# Patient Record
Sex: FEMALE | URBAN - METROPOLITAN AREA
[De-identification: names, ages, dates, MRNs, and addresses within clinical notes are randomized per-mention and may not be internally consistent; named-entity substitution may affect disease eponyms.]

---

## 2017-01-03 ENCOUNTER — APPOINTMENT (RX ONLY)
Dept: URBAN - METROPOLITAN AREA CLINIC 106 | Facility: CLINIC | Age: 57
Setting detail: DERMATOLOGY
End: 2017-01-03

## 2017-01-03 DIAGNOSIS — L72.0 EPIDERMAL CYST: ICD-10-CM

## 2017-01-03 DIAGNOSIS — L85.3 XEROSIS CUTIS: ICD-10-CM

## 2017-01-03 PROBLEM — L70.0 ACNE VULGARIS: Status: ACTIVE | Noted: 2017-01-03

## 2017-01-03 PROBLEM — J30.1 ALLERGIC RHINITIS DUE TO POLLEN: Status: ACTIVE | Noted: 2017-01-03

## 2017-01-03 PROBLEM — L23.7 ALLERGIC CONTACT DERMATITIS DUE TO PLANTS, EXCEPT FOOD: Status: ACTIVE | Noted: 2017-01-03

## 2017-01-03 PROBLEM — L55.1 SUNBURN OF SECOND DEGREE: Status: ACTIVE | Noted: 2017-01-03

## 2017-01-03 PROBLEM — D48.5 NEOPLASM OF UNCERTAIN BEHAVIOR OF SKIN: Status: ACTIVE | Noted: 2017-01-03

## 2017-01-03 PROCEDURE — ? OTHER

## 2017-01-03 PROCEDURE — 99202 OFFICE O/P NEW SF 15 MIN: CPT

## 2017-01-03 PROCEDURE — ? COUNSELING

## 2017-01-03 ASSESSMENT — LOCATION ZONE DERM: LOCATION ZONE: FACE

## 2017-01-03 ASSESSMENT — LOCATION DETAILED DESCRIPTION DERM
LOCATION DETAILED: RIGHT SUPERIOR LATERAL BUCCAL CHEEK
LOCATION DETAILED: LEFT INFERIOR CENTRAL MALAR CHEEK

## 2017-01-03 ASSESSMENT — LOCATION SIMPLE DESCRIPTION DERM
LOCATION SIMPLE: RIGHT CHEEK
LOCATION SIMPLE: LEFT CHEEK

## 2017-01-03 NOTE — HPI: NODULE (SMALL BUMP UNDERNEATH SKIN)
How Severe Is Your Nodule?: moderate
Is This A New Presentation, Or A Follow-Up?: Nodule
Year Removed: 1900

## 2017-01-03 NOTE — PROCEDURE: OTHER
Other (Free Text): Patient presents with a firm, freely mobile, smooth nodule deep in the tissue in between the skin and the mucous membrane.  She has had this lesion for over 30 years, and although stable in size and benign (per reports by previous evaluation by ENT and other physicians), it has recently become symptomatic once again with pain.  For removal, I recommend consultation with Dr. De La Cruz in Oral Surgery to avoid cutaneous scarring.
Note Text (......Xxx Chief Complaint.): This diagnosis correlates with the
Detail Level: Simple